# Patient Record
Sex: FEMALE | Race: BLACK OR AFRICAN AMERICAN | NOT HISPANIC OR LATINO | Employment: OTHER | URBAN - METROPOLITAN AREA
[De-identification: names, ages, dates, MRNs, and addresses within clinical notes are randomized per-mention and may not be internally consistent; named-entity substitution may affect disease eponyms.]

---

## 2019-05-30 RX ORDER — POTASSIUM CHLORIDE 750 MG/1
TABLET, EXTENDED RELEASE ORAL
Qty: 30 TABLET | Refills: 0 | OUTPATIENT
Start: 2019-05-30

## 2019-07-18 PROBLEM — R53.83 FATIGUE: Status: ACTIVE | Noted: 2019-05-26

## 2019-07-18 PROBLEM — E78.5 HYPERLIPIDEMIA: Status: ACTIVE | Noted: 2019-07-18

## 2019-07-18 PROBLEM — B37.89 CANDIDIASIS OF BREAST: Status: ACTIVE | Noted: 2019-07-18

## 2019-07-18 PROBLEM — E11.9 TYPE 2 DIABETES MELLITUS WITHOUT COMPLICATION, WITHOUT LONG-TERM CURRENT USE OF INSULIN: Status: ACTIVE | Noted: 2019-07-18

## 2019-07-18 PROBLEM — R73.9 HYPERGLYCEMIA: Status: ACTIVE | Noted: 2019-07-18

## 2019-11-19 PROBLEM — M54.2 PAIN, NECK: Status: ACTIVE | Noted: 2019-11-19

## 2019-12-02 PROBLEM — R93.1 ABNORMAL ECHOCARDIOGRAM: Status: ACTIVE | Noted: 2019-12-02

## 2019-12-02 PROBLEM — R06.09 DOE (DYSPNEA ON EXERTION): Status: ACTIVE | Noted: 2019-12-02

## 2020-06-04 PROBLEM — Z78.9 STATIN INTOLERANCE: Status: ACTIVE | Noted: 2020-06-04

## 2020-06-06 PROBLEM — I27.20 PULMONARY HYPERTENSION: Status: ACTIVE | Noted: 2020-06-06

## 2020-06-06 PROBLEM — R07.9 CHEST PAIN: Status: ACTIVE | Noted: 2020-06-06

## 2020-06-07 PROBLEM — R07.9 CHEST PAIN: Status: RESOLVED | Noted: 2020-06-06 | Resolved: 2020-06-07

## 2020-06-07 PROBLEM — K21.9 GERD (GASTROESOPHAGEAL REFLUX DISEASE): Status: ACTIVE | Noted: 2020-06-07

## 2020-09-17 PROBLEM — H91.90 HEARING LOSS: Status: ACTIVE | Noted: 2020-09-17

## 2020-09-17 PROBLEM — H61.20 IMPACTED CERUMEN: Status: ACTIVE | Noted: 2020-09-17

## 2021-04-21 DIAGNOSIS — E11.9 TYPE 2 DIABETES MELLITUS WITHOUT COMPLICATION: ICD-10-CM

## 2021-05-12 ENCOUNTER — PATIENT MESSAGE (OUTPATIENT)
Dept: RESEARCH | Facility: HOSPITAL | Age: 66
End: 2021-05-12

## 2021-06-21 ENCOUNTER — PATIENT OUTREACH (OUTPATIENT)
Dept: ADMINISTRATIVE | Facility: HOSPITAL | Age: 66
End: 2021-06-21

## 2021-06-21 DIAGNOSIS — Z12.31 SCREENING MAMMOGRAM, ENCOUNTER FOR: Primary | ICD-10-CM

## 2021-10-04 PROBLEM — I27.20 PULMONARY HYPERTENSION: Status: RESOLVED | Noted: 2020-06-06 | Resolved: 2021-10-04

## 2021-10-04 PROBLEM — E66.09 CLASS 1 OBESITY DUE TO EXCESS CALORIES WITH SERIOUS COMORBIDITY AND BODY MASS INDEX (BMI) OF 31.0 TO 31.9 IN ADULT: Status: ACTIVE | Noted: 2021-10-04

## 2021-10-04 PROBLEM — E66.811 CLASS 1 OBESITY DUE TO EXCESS CALORIES WITH SERIOUS COMORBIDITY AND BODY MASS INDEX (BMI) OF 31.0 TO 31.9 IN ADULT: Status: ACTIVE | Noted: 2021-10-04

## 2022-04-14 PROBLEM — H25.811 COMBINED FORM OF AGE-RELATED CATARACT, RIGHT EYE: Status: ACTIVE | Noted: 2022-04-14

## 2022-05-12 PROBLEM — H35.419 RETINAL LATTICE DEGENERATION: Status: ACTIVE | Noted: 2022-05-12

## 2022-05-12 PROBLEM — H43.811 POSTERIOR VITREOUS DETACHMENT OF RIGHT EYE: Status: ACTIVE | Noted: 2022-05-12

## 2022-05-12 PROBLEM — H25.812 COMBINED FORM OF AGE-RELATED CATARACT, LEFT EYE: Status: ACTIVE | Noted: 2022-05-12

## 2022-05-12 PROBLEM — H04.123 DRY EYE SYNDROME OF BOTH EYES: Status: ACTIVE | Noted: 2022-05-12

## 2022-07-14 PROBLEM — F41.9 ANXIETY DISORDER: Status: ACTIVE | Noted: 2022-07-14

## 2022-12-14 PROBLEM — I51.7 LVH (LEFT VENTRICULAR HYPERTROPHY): Status: ACTIVE | Noted: 2022-12-14

## 2023-12-08 ENCOUNTER — PATIENT MESSAGE (OUTPATIENT)
Dept: ADMINISTRATIVE | Facility: OTHER | Age: 68
End: 2023-12-08

## 2024-02-26 PROBLEM — F43.10 PTSD (POST-TRAUMATIC STRESS DISORDER): Status: ACTIVE | Noted: 2024-02-26

## 2024-02-28 ENCOUNTER — SOCIAL WORK (OUTPATIENT)
Dept: ADMINISTRATIVE | Facility: OTHER | Age: 69
End: 2024-02-28

## 2024-02-28 NOTE — PROGRESS NOTES
Sw received a consult to assist with counseling services. Sw called and spoke to Patient ([phone]). She expressed interest in counseling. Sw mailed patient the contact information for some in-network providers. She was encouraged to contact one at her convenience to schedule an assessment.    Dayna Montelongo LCSW    [phone]

## 2024-03-19 PROBLEM — E66.813 CLASS 3 OBESITY: Status: ACTIVE | Noted: 2024-03-19

## 2024-03-19 PROBLEM — E66.01 CLASS 3 OBESITY: Status: ACTIVE | Noted: 2024-03-19

## 2024-05-16 PROBLEM — R07.9 CHEST PAIN: Status: ACTIVE | Noted: 2024-05-16

## 2025-03-06 PROBLEM — E11.69 TYPE 2 DIABETES MELLITUS WITH OTHER SPECIFIED COMPLICATION, WITH LONG-TERM CURRENT USE OF INSULIN: Status: ACTIVE | Noted: 2025-03-06

## 2025-03-06 PROBLEM — Z79.4 TYPE 2 DIABETES MELLITUS WITH OTHER SPECIFIED COMPLICATION, WITH LONG-TERM CURRENT USE OF INSULIN: Status: ACTIVE | Noted: 2025-03-06

## 2025-03-06 PROBLEM — E66.01 MORBID (SEVERE) OBESITY DUE TO EXCESS CALORIES: Status: ACTIVE | Noted: 2021-10-04

## 2025-03-20 PROBLEM — I42.1 HOCM (HYPERTROPHIC OBSTRUCTIVE CARDIOMYOPATHY): Status: ACTIVE | Noted: 2025-03-20

## 2025-07-22 ENCOUNTER — PATIENT OUTREACH (OUTPATIENT)
Dept: ADMINISTRATIVE | Facility: OTHER | Age: 70
End: 2025-07-22

## 2025-07-22 NOTE — PROGRESS NOTES
CHW Follow Up: A call was placed to the patient regarding affordability assistance for the medications Farxiga and Entresto. The patient reported she currently has [employer], which is helping cover the cost. However, she expressed interest in enrolling in the PAP program as a backup in case the supports ends with her insurances. The patient was asked to provide financial documentation for enrollment and stated she  will do so soon.      Bee Shah, [facility] [place], CPhT, ChW    [phone] Ph  [phone] Fax